# Patient Record
Sex: FEMALE | URBAN - METROPOLITAN AREA
[De-identification: names, ages, dates, MRNs, and addresses within clinical notes are randomized per-mention and may not be internally consistent; named-entity substitution may affect disease eponyms.]

---

## 2022-08-15 ENCOUNTER — OFFICE VISIT (OUTPATIENT)
Dept: SURGERY | Age: 22
End: 2022-08-15
Payer: MEDICAID

## 2022-08-15 VITALS
HEIGHT: 70 IN | DIASTOLIC BLOOD PRESSURE: 72 MMHG | OXYGEN SATURATION: 100 % | TEMPERATURE: 97 F | BODY MASS INDEX: 30.92 KG/M2 | WEIGHT: 216 LBS | SYSTOLIC BLOOD PRESSURE: 110 MMHG | HEART RATE: 89 BPM

## 2022-08-15 DIAGNOSIS — L05.01 PILONIDAL CYST WITH ABSCESS: Primary | ICD-10-CM

## 2022-08-15 PROCEDURE — 99204 OFFICE O/P NEW MOD 45 MIN: CPT | Performed by: SURGERY

## 2022-08-15 RX ORDER — SULFAMETHOXAZOLE AND TRIMETHOPRIM 800; 160 MG/1; MG/1
1 TABLET ORAL 2 TIMES DAILY
Qty: 30 TABLET | Refills: 0 | Status: SHIPPED | OUTPATIENT
Start: 2022-08-15

## 2022-08-15 NOTE — PROGRESS NOTES
General Surgery Consult    Elizabeth Frye  Admit date: (Not on file)    MRN: [id number]     : 2000     Age: 23 y.o. Attending Physician: Elisabeth Pantoja MD, [facility]      History of Present Illness:      Elizabeth Frye is a 23 y.o. female who who was referred to me by the pediatric clinic for evaluation of a pilonidal cyst with an abscess. The patient stated that she had this for about 2 months now and it has increased in size and all of a sudden last week it started draining and the patient feels slightly better. She denies any fever or chills. She stated that there has been some drainage but currently there is none. She is here with her mom today. There are no problems to display for this patient. History reviewed. No pertinent past medical history. History reviewed. No pertinent surgical history. Social History     Tobacco Use    Smoking status: Never    Smokeless tobacco: Never   Substance Use Topics    Alcohol use: Not on file      Social History     Tobacco Use   Smoking Status Never   Smokeless Tobacco Never     History reviewed. No pertinent family history. Current Outpatient Medications   Medication Sig    trimethoprim-sulfamethoxazole (BACTRIM DS, SEPTRA DS) 160-800 mg per tablet Take 1 Tablet by mouth two (2) times a day. No current facility-administered medications for this visit. No Known Allergies       Review of Systems:  Pertinent items are noted in the History of Present Illness.     Objective:     Visit Vitals  /72 (BP 1 Location: Right lower arm, BP Patient Position: Sitting, BP Cuff Size: Small adult)   Pulse 89   Temp 97 °F (36.1 °C) (Temporal)   Ht 5' 10\" (1.778 m)   Wt 98 kg (216 lb)   LMP 2022 (Exact Date)   SpO2 100%   BMI 30.99 kg/m²       Physical Exam:      General:  in no apparent distress, alert, and oriented times 3   Eyes:  conjunctivae and sclerae normal, pupils equal, round, reactive to light   Throat & Neck: no erythema or exudates noted and neck supple and symmetrical; no palpable masses   Lungs:   clear to auscultation bilaterally   Heart:  Regular rate and rhythm   Abdomen:   rounded, soft, nontender, nondistended, no masses or organomegaly. There is an evidence of pilonidal cyst with some induration with possible abscess but there is an opening and there is no drainage. The cyst is about 5 cm long by 3 cm wide but hard to assess the exact size because of the inflammation and induration. Extremities: extremities normal, atraumatic, no cyanosis or edema   Skin: Normal.       Imaging and Lab Review:     CBC: No results found for: WBC, RBC, HGB, HCT, PLT, HGBEXT, HCTEXT, PLTEXT  BMP: No results found for: GLU, NA, K, CL, CO2, BUN, CREA, CA  CMP:No results found for: GLU, NA, K, CL, CO2, BUN, CREA, CA, AGAP, BUCR, TBIL, AP, TP, ALB, GLOB, AGRAT    No results found for this or any previous visit (from the past 24 hour(s)). images and reports reviewed    Assessment:   Jenn Casas is a 23 y.o. female who is presenting with a pilonidal cyst with an abscess that has spontaneously drained. I explained to the patient and her mother that for now we will need to give her a long course of antibiotics and do sitz bath and wait for the inflammation and infection to subside completely before we make a decision on surgical excision.      Plan:     I wrote the patient Bactrim twice a day  Warm sitz bath  Keep the area clean and dry  Follow-up in 1 week    Please call me if you have any questions (cell phone: [phone])     Signed By: Pedro Estevez MD     August 15, 2022

## 2022-08-15 NOTE — PROGRESS NOTES
Boom Tran is a 23 y.o. female (: 2000) presenting to address:    Chief Complaint   Patient presents with    New Patient     Pilonidal cyst with abscess/ referred by [facility]       Medication list and allergies have been reviewed with Boom Tran and updated as of today's date. I have gone over all Medical, Surgical and Social History with Boom Tran and updated/added the information accordingly.

## 2022-08-25 ENCOUNTER — OFFICE VISIT (OUTPATIENT)
Dept: SURGERY | Age: 22
End: 2022-08-25
Payer: MEDICAID

## 2022-08-25 VITALS
HEART RATE: 69 BPM | OXYGEN SATURATION: 99 % | SYSTOLIC BLOOD PRESSURE: 121 MMHG | DIASTOLIC BLOOD PRESSURE: 77 MMHG | HEIGHT: 70 IN | TEMPERATURE: 98.6 F | RESPIRATION RATE: 16 BRPM | WEIGHT: 213 LBS | BODY MASS INDEX: 30.49 KG/M2

## 2022-08-25 DIAGNOSIS — L05.01 PILONIDAL CYST WITH ABSCESS: Primary | ICD-10-CM

## 2022-08-25 PROCEDURE — 99214 OFFICE O/P EST MOD 30 MIN: CPT | Performed by: SURGERY

## 2022-08-25 NOTE — PROGRESS NOTES
Fronie Lombard is a 23 y.o. female  Chief Complaint   Patient presents with    Follow-up     1 week follow up pilonidal cyst and abscess

## 2022-08-25 NOTE — PROGRESS NOTES
General Surgery Consult    Elizabeth Frye  Admit date: (Not on file)    MRN: [id number]     : 2000     Age: 23 y.o. Attending Physician: Elisabeth Pantoja MD, [facility]      History of Present Illness:      Elizabeth Frye is a 23 y.o. female who is here today with her mom for follow-up on her pilonidal cyst.  I seen the patient last week and she had a pilonidal cyst with an abscess that has been causing pain and discomfort and we gave her antibiotics and the patient is doing much better. He stated that she denies any pain currently and the swelling has decreased remarkably. She denies any fever or chills and she denies any drainage. There are no problems to display for this patient. History reviewed. No pertinent past medical history. History reviewed. No pertinent surgical history. Social History     Tobacco Use    Smoking status: Never    Smokeless tobacco: Never   Substance Use Topics    Alcohol use: Never      Social History     Tobacco Use   Smoking Status Never   Smokeless Tobacco Never     History reviewed. No pertinent family history. Current Outpatient Medications   Medication Sig    trimethoprim-sulfamethoxazole (BACTRIM DS, SEPTRA DS) 160-800 mg per tablet Take 1 Tablet by mouth two (2) times a day. (Patient not taking: Reported on 2022)     No current facility-administered medications for this visit. No Known Allergies       Review of Systems:  Pertinent items are noted in the History of Present Illness.     Objective:     Visit Vitals  /77   Pulse 69   Temp 98.6 °F (37 °C) (Temporal)   Resp 16   Ht 5' 10\" (1.778 m)   Wt 96.6 kg (213 lb)   LMP 2022 (Exact Date)   SpO2 99%   BMI 30.56 kg/m²       Physical Exam:      General:  in no apparent distress   Eyes:  conjunctivae and sclerae normal, pupils equal, round, reactive to light   Throat & Neck: no erythema or exudates noted and neck supple and symmetrical; no palpable masses   Lungs:   clear to auscultation bilaterally   Heart:  Regular rate and rhythm   Abdomen:   rounded, soft, nontender, nondistended, no masses or organomegaly. The area of the pilonidal cyst with an abscess has improved remarkably with currently no fluctuation. The cyst is still clearly obvious and there is still some slight induration to the right of it where it extended before where the abscess was. Extremities: extremities normal, atraumatic, no cyanosis or edema   Skin: Normal.       Imaging and Lab Review:     CBC: No results found for: WBC, RBC, HGB, HCT, PLT, HGBEXT, HCTEXT, PLTEXT  BMP: No results found for: GLU, NA, K, CL, CO2, BUN, CREA, CA  CMP:No results found for: GLU, NA, K, CL, CO2, BUN, CREA, CA, AGAP, BUCR, TBIL, AP, TP, ALB, GLOB, AGRAT    No results found for this or any previous visit (from the past 24 hour(s)). images and reports reviewed    Assessment:   Vickey Alatorre is a 23 y.o. female who presents initially with a parental cyst with an abscess that has been responding well to the p.o. antibiotics. The patient is currently doing much better and there is no pain or drainage. There is still some induration but no fluctuation and I would like for the patient to finish the course of antibiotic and give it another 2 to 3 weeks before I see her. I told her and her mother that we would like for the infection to completely subside before excising the cyst.  I also discussed with them the surgery and the risks including the need for keeping the wound open and the need for packing as well as the risk of recurrence.      Plan:     Darcie Goldsmith the course of the antibiotics  Follow-up with me in 2 to 3 weeks to discuss the surgery    Please call me if you have any questions (cell phone: [phone])     Signed By: Rolly Rand MD     August 25, 2022

## 2022-09-22 ENCOUNTER — OFFICE VISIT (OUTPATIENT)
Dept: SURGERY | Age: 22
End: 2022-09-22
Payer: MEDICAID

## 2022-09-22 VITALS — HEART RATE: 109 BPM | SYSTOLIC BLOOD PRESSURE: 108 MMHG | OXYGEN SATURATION: 99 % | DIASTOLIC BLOOD PRESSURE: 68 MMHG

## 2022-09-22 DIAGNOSIS — L05.01 PILONIDAL CYST WITH ABSCESS: Primary | ICD-10-CM

## 2022-09-22 PROCEDURE — 99214 OFFICE O/P EST MOD 30 MIN: CPT | Performed by: SURGERY

## 2022-09-22 NOTE — PROGRESS NOTES
Raymon Sprague is a 23 y.o. female (: 2000) presenting to address:    Chief Complaint   Patient presents with    Follow-up     Pilonidal cyst with abscess/last seen  22/ finished antibiotics. Medication list and allergies have been reviewed with Raymon Sprague and updated as of today's date. I have gone over all Medical, Surgical and Social History with Raymon Sprague and updated/added the information accordingly. 1. Have you been to the ER, Urgent Care or Hospitalized since your last visit? NO      2. Have you followed up with your PCP or any other Physicians since your procedure/ last office visit?    NO

## 2022-09-26 NOTE — PROGRESS NOTES
General Surgery Consult    Génesis Otto  Admit date: (Not on file)    MRN: [id number]     : 2000     Age: 23 y.o. Attending Physician: Kim Meng MD, [facility]      History of Present Illness:      Génesis Otto is a 23 y.o. female who we are following for evaluation of a pilonidal cyst.  Patient had this cyst for years and recently it was developed into an abscess for which she took a full course of antibiotics and currently she is here for her second follow-up visit with me. She is doing very well and she stated that she has no drainage or pain and she believes that the cyst has completely healed. She denies any fever. There are no problems to display for this patient. History reviewed. No pertinent past medical history. History reviewed. No pertinent surgical history. Social History     Tobacco Use    Smoking status: Never    Smokeless tobacco: Never   Substance Use Topics    Alcohol use: Never      Social History     Tobacco Use   Smoking Status Never   Smokeless Tobacco Never     No family history on file. Current Outpatient Medications   Medication Sig    trimethoprim-sulfamethoxazole (BACTRIM DS, SEPTRA DS) 160-800 mg per tablet Take 1 Tablet by mouth two (2) times a day. (Patient not taking: Reported on 2022)     No current facility-administered medications for this visit. No Known Allergies       Review of Systems:  Pertinent items are noted in the History of Present Illness.     Objective:     Visit Vitals  /68 (BP 1 Location: Right upper arm, BP Patient Position: Sitting, BP Cuff Size: Large adult)   Pulse (!) 109   Wt (P) 96.2 kg (212 lb)   SpO2 99%   BMI (P) 30.42 kg/m²       Physical Exam:      General:  in no apparent distress, alert, oriented times 3, and afebrile   Eyes:  conjunctivae and sclerae normal, pupils equal, round, reactive to light   Throat & Neck: no erythema or exudates noted and neck supple and symmetrical; no palpable masses Lungs:   clear to auscultation bilaterally   Heart:  Regular rate and rhythm   Abdomen:   rounded, soft, nontender, nondistended, no masses or organomegaly. The area over the pilonidal abscess was has completely healed and now there is a very small opening consistent with a pilonidal cyst but no induration or fluctuation and no erythema. Extremities: extremities normal, atraumatic, no cyanosis or edema   Skin: Normal.       Imaging and Lab Review:     CBC: No results found for: WBC, RBC, HGB, HCT, PLT, HGBEXT, HCTEXT, PLTEXT  BMP: No results found for: GLU, NA, K, CL, CO2, BUN, CREA, CA  CMP:No results found for: GLU, NA, K, CL, CO2, BUN, CREA, CA, AGAP, BUCR, TBIL, AP, TP, ALB, GLOB, AGRAT    No results found for this or any previous visit (from the past 24 hour(s)). images and reports reviewed    Assessment:   Jenn Casas is a 23 y.o. female who is presenting with a pilonidal cyst that has been present for years and recently it was an abscess that was treated well with antibiotics. I had a long discussion with the patient and explained to her that now she has the option of surgical excision in the operating room or observation. I explained to her that the surgical excision has the least chance of recurrence but still recurrence is possible and there is a chance of infection and needing the wound to stay open. I explained to her that if we observe it there is a chance of recurrence is higher than the surgical excision. She decided to observe it for now and follow-up in 3 to 6 months.     Plan:     Observation  Follow-up in 3 to 6 months or earlier if needed    Please call me if you have any questions (cell phone: [phone])     Signed By: Pedro Estevez MD     September 26, 2022